# Patient Record
Sex: FEMALE | Race: WHITE
[De-identification: names, ages, dates, MRNs, and addresses within clinical notes are randomized per-mention and may not be internally consistent; named-entity substitution may affect disease eponyms.]

---

## 2017-11-17 ENCOUNTER — HOSPITAL ENCOUNTER (EMERGENCY)
Dept: HOSPITAL 25 - UCEAST | Age: 67
Discharge: HOME | End: 2017-11-17
Payer: MEDICARE

## 2017-11-17 VITALS — DIASTOLIC BLOOD PRESSURE: 71 MMHG | SYSTOLIC BLOOD PRESSURE: 128 MMHG

## 2017-11-17 DIAGNOSIS — J01.90: Primary | ICD-10-CM

## 2017-11-17 PROCEDURE — 99212 OFFICE O/P EST SF 10 MIN: CPT

## 2017-11-17 PROCEDURE — G0463 HOSPITAL OUTPT CLINIC VISIT: HCPCS

## 2017-11-17 NOTE — UC
Respiratory Complaint HPI





- HPI Summary


HPI Summary: 





66 yo female with one week hx of sinus pressure and pain/post nasal drip and 

upper teeth and gum pain/sensitivity





no f/c











- History of Current Complaint


Chief Complaint: UCGeneralIllness


Stated Complaint: SINUS CONGESTION


Hx Obtained From: Patient


Onset/Duration: Gradual Onset, Lasting Weeks


Timing: Constant


Severity Initially: Mild


Severity Currently: Moderate


Pain Intensity: 4


Pain Scale Used: 0-10 Numeric


Character: Cough: Nonproductive


Alleviating Factors: Nothing


Associated Signs And Symptoms: Positive: Nasal Congestion, Hoarseness, Sinus 

Discomfort


Related History: Similar Episode/Dx as: - acute sinusitis





- Allergies/Home Medications


Allergies/Adverse Reactions: 


 Allergies











Allergy/AdvReac Type Severity Reaction Status Date / Time


 


Amoxicillin [From Augmentin] Allergy  Diarrhea Verified 11/17/17 11:43


 


Clavulanic Acid Allergy  Diarrhea Verified 11/17/17 11:43





[From Augmentin]     


 


Codeine Allergy  Nausea Verified 11/17/17 11:43


 


Terfenadine [From [place]] Allergy  Unknown Verified 11/17/17 11:43





   Reaction  





   Details  


 


Tramadol Allergy  Nausea And Verified 11/17/17 11:43





   Vomiting  











Home Medications: 


 Home Medications





Alendronate Sodium [Fosamax-] 1 tab PO WEEKLY 11/17/17 [History Confirmed 11/17/ 17]


Cyclosporine 0.05% OPHTH (NF) [Restasis 0.05% OPHTH] 1 drop BOTH EYES BID 11/17/ 17 [History Confirmed 11/17/17]


clonazePAM TAB(*) [Klonopin TAB(*)] 1 tab PO BEDTIME PRN 11/17/17 [History 

Confirmed 11/17/17]











PMH/Surg Hx/FS Hx/Imm Hx


Previously Healthy: Yes


Respiratory History: Asthma - childhood


GI/ History: Gastroesophageal Reflux





- Surgical History


Surgical History: Yes


Surgery Procedure, Year, and Place: CHOLECYSTECTOMY; TONSILECTOMY





- Family History


Known Family History: Positive: Other - unknown, is adopted





- Social History


Alcohol Use: None


Substance Use Type: None


Smoking Status (MU): Never Smoked Tobacco





- Immunization History


Most Recent Influenza Vaccination: 10/2017





Review of Systems


Constitutional: Negative


Skin: Negative


Eyes: Negative


ENT: Dental Pain, Nasal Discharge, Sinus Congestion, Sinus Pain/Tenderness


Respiratory: Negative


Cardiovascular: Negative


Gastrointestinal: Negative


Genitourinary: Negative


Motor: Negative


Neurovascular: Negative


Musculoskeletal: Negative


Neurological: Negative


Psychological: Negative


Is Patient Immunocompromised?: No


All Other Systems Reviewed And Are Negative: Yes





Physical Exam


Triage Information Reviewed: Yes


Appearance: Well-Appearing, No Pain Distress, Well-Nourished


Vital Signs: 


 Initial Vital Signs











Temp  97.5 F   11/17/17 11:37


 


Pulse  86   11/17/17 11:37


 


Resp  16   11/17/17 11:37


 


BP  128/71   11/17/17 11:37


 


Pulse Ox  100   11/17/17 11:37











Vital Signs Reviewed: Yes


Eyes: Positive: Conjunctiva Clear


ENT: Positive: Hearing grossly normal, Nasal drainage, TMs normal, Sinus 

tenderness, Uvula midline


Neck: Positive: Supple, Nontender


Respiratory: Positive: Lungs clear, Normal breath sounds, No respiratory 

distress, No accessory muscle use


Cardiovascular: Positive: RRR, No Murmur


Neurological: Positive: Alert


Psychological Exam: Normal


Skin Exam: Normal





UC Diagnostic Evaluation





- Laboratory


O2 Sat by Pulse Oximetry: 100 - normal/not hypoxic





Respiratory Course/Dx





- Differential Dx/Diagnosis


Provider Diagnoses: acute sinusitis





Discharge





- Discharge Plan


Condition: Stable


Disposition: HOME


Prescriptions: 


Amoxicillin PO (*) [Amoxicillin 875 MG (*)] 875 mg PO BID #20 tab


Fluticasone NASAL SPRAY 50MCG* [Flonase NASAL SPRAY 50MCG*] 2 spray BOTH NARES 

DAILY #1 btl


Patient Education Materials:  Sinusitis (ED), Warm Compress or Soak (ED)


Referrals: 


Carlos Bennett MD [Medical Doctor] - 5 Days (if not improved)

## 2019-04-04 ENCOUNTER — HOSPITAL ENCOUNTER (EMERGENCY)
Dept: HOSPITAL 25 - UCEAST | Age: 69
Discharge: HOME | End: 2019-04-04
Payer: MEDICARE

## 2019-04-04 VITALS — SYSTOLIC BLOOD PRESSURE: 116 MMHG | DIASTOLIC BLOOD PRESSURE: 76 MMHG

## 2019-04-04 DIAGNOSIS — Z88.0: ICD-10-CM

## 2019-04-04 DIAGNOSIS — J40: Primary | ICD-10-CM

## 2019-04-04 PROCEDURE — G0463 HOSPITAL OUTPT CLINIC VISIT: HCPCS

## 2019-04-04 PROCEDURE — 71046 X-RAY EXAM CHEST 2 VIEWS: CPT

## 2019-04-04 PROCEDURE — 99212 OFFICE O/P EST SF 10 MIN: CPT

## 2019-04-04 NOTE — UC
Respiratory Complaint HPI





- HPI Summary


HPI Summary: 





cough x 1 week


cough is productive, yellow sputum 


nasal congestion , post nasal drip


wheezing, sob, pain with deep breathing 


no fever, no chills, 





- History of Current Complaint


Chief Complaint: UCRespiratory


Stated Complaint: COUGH


Time Seen by Provider: 04/04/19 10:10


Hx Obtained From: Patient


Onset/Duration: Gradual Onset, Lasting Weeks - 1, Still Present


Timing: Constant


Severity Initially: Moderate


Severity Currently: Moderate


Pain Intensity: 0


Character: Cough: Productive


Aggravating Factors: Exertion, Deep Breaths


Alleviating Factors: Nothing


Associated Signs And Symptoms: Positive: Dyspnea, Chills, Pleuritic Chest Pain, 

Wheezing, URI, Nasal Congestion.  Negative: Fever, Hemoptysis, Dizziness, Calf 

Pain, Calf Swelling, Edema





- Allergies/Home Medications


Allergies/Adverse Reactions: 


 Allergies











Allergy/AdvReac Type Severity Reaction Status Date / Time


 


amoxicillin Allergy  Diarrhea Verified 04/04/19 10:21


 


clavulanic acid Allergy  Diarrhea Verified 04/04/19 10:21


 


terfenadine Allergy  Unknown Verified 04/04/19 10:21





   Reaction  





   Details  


 


tramadol Allergy  Nausea Verified 04/04/19 10:21














PMH/Surg Hx/FS Hx/Imm Hx





- Additional Past Medical History


Additional PMH: 





vertigo


cervical headaches


GERD


Respiratory History: Asthma


GI/ History: Gastroesophageal Reflux





- Surgical History


Surgical History: Yes


Surgery Procedure, Year, and Place: CHOLECYSTECTOMY; TONSILECTOMY





- Family History


Known Family History: Positive: Other - unknown, is adopted





- Social History


Alcohol Use: None


Substance Use Type: None


Smoking Status (MU): Never Smoked Tobacco





- Immunization History


Most Recent Influenza Vaccination: 10/2017





Review of Systems


All Other Systems Reviewed And Are Negative: Yes


Constitutional: Positive: Chills, Fatigue


Skin: Positive: Negative


Eyes: Positive: Negative


ENT: Positive: Nasal Discharge


Respiratory: Positive: Shortness Of Breath, Cough


Cardiovascular: Positive: Negative


Is Patient Immunocompromised?: No





Physical Exam


Triage Information Reviewed: Yes


Appearance: Well-Appearing, No Pain Distress, Well-Nourished


Vital Signs: 


 Initial Vital Signs











Temp  98.2 F   04/04/19 10:15


 


Pulse  115   04/04/19 10:15


 


Resp  18   04/04/19 10:15


 


BP  116/76   04/04/19 10:15


 


Pulse Ox  96   04/04/19 10:15











Vital Signs Reviewed: Yes


Eye Exam: Normal


Eyes: Positive: Conjunctiva Clear


ENT: Positive: Pharynx normal, Nasal drainage


Neck: Positive: Supple, Nontender, No Lymphadenopathy


Respiratory: Positive: Chest non-tender, Wheezing


Cardiovascular: Positive: Tachycardia





Diagnostics





- Radiology


  ** No standard instances


Radiology Interpretation Completed By: Radiologist


Summary of Radiographic Findings: chest xray : IMPRESSION: NO EVIDENCE FOR 

ACTIVE CARDIOPULMONARY DISEASE.





Respiratory Course/Dx





- Differential Dx/Diagnosis


Provider Diagnosis: 


 Bronchitis








Discharge





- Sign-Out/Discharge


Documenting (check all that apply): Patient Departure


All imaging exams completed and their final reports reviewed: Yes





- Discharge Plan


Condition: Stable


Disposition: HOME


Prescriptions: 


Albuterol HFA INHALER* [Ventolin HFA Inhaler*] 2 puff INH Q6H PRN #1 mdi


 PRN Reason: Wheezing


DOXYcycline CAP(*) [DOXYcycline 100MG CAP(*)] 100 mg PO BID #20 cap


predniSONE [Prednisone 20 MG TAB] 20 mg PO BID #10 tablet


Patient Education Materials:  Acute Bronchitis (ED)


Referrals: 


Carlos Bennett MD [Primary Care Provider] - 7 Days





- Billing Disposition and Condition


Condition: STABLE


Disposition: Home

## 2019-07-29 ENCOUNTER — HOSPITAL ENCOUNTER (EMERGENCY)
Dept: HOSPITAL 25 - UCEAST | Age: 69
Discharge: HOME HEALTH | End: 2019-07-29
Payer: MEDICARE

## 2019-07-29 ENCOUNTER — HOSPITAL ENCOUNTER (EMERGENCY)
Dept: HOSPITAL 25 - ED | Age: 69
LOS: 1 days | Discharge: HOME | End: 2019-07-30
Payer: MEDICARE

## 2019-07-29 VITALS — DIASTOLIC BLOOD PRESSURE: 78 MMHG | SYSTOLIC BLOOD PRESSURE: 161 MMHG

## 2019-07-29 DIAGNOSIS — Z88.5: ICD-10-CM

## 2019-07-29 DIAGNOSIS — R20.2: ICD-10-CM

## 2019-07-29 DIAGNOSIS — Z88.1: ICD-10-CM

## 2019-07-29 DIAGNOSIS — R11.0: ICD-10-CM

## 2019-07-29 DIAGNOSIS — R11.0: Primary | ICD-10-CM

## 2019-07-29 DIAGNOSIS — R00.2: ICD-10-CM

## 2019-07-29 DIAGNOSIS — Z88.8: ICD-10-CM

## 2019-07-29 DIAGNOSIS — R10.32: Primary | ICD-10-CM

## 2019-07-29 DIAGNOSIS — Z88.0: ICD-10-CM

## 2019-07-29 DIAGNOSIS — K44.9: ICD-10-CM

## 2019-07-29 LAB
ALBUMIN SERPL BCG-MCNC: 4.5 G/DL (ref 3.2–5.2)
ALBUMIN/GLOB SERPL: 1.2 {RATIO} (ref 1–3)
ALP SERPL-CCNC: 53 U/L (ref 34–104)
ALT SERPL W P-5'-P-CCNC: 42 U/L (ref 7–52)
ANION GAP SERPL CALC-SCNC: 8 MMOL/L (ref 2–11)
APTT PPP: 32.4 SECONDS (ref 26–38)
AST SERPL-CCNC: 33 U/L (ref 13–39)
BASOPHILS # BLD AUTO: 0 10^3/UL (ref 0–0.2)
BUN SERPL-MCNC: 12 MG/DL (ref 6–24)
BUN/CREAT SERPL: 15.6 (ref 8–20)
CALCIUM SERPL-MCNC: 9.6 MG/DL (ref 8.6–10.3)
CHLORIDE SERPL-SCNC: 106 MMOL/L (ref 101–111)
EOSINOPHIL # BLD AUTO: 0.2 10^3/UL (ref 0–0.6)
GLOBULIN SER CALC-MCNC: 3.9 G/DL (ref 2–4)
GLUCOSE SERPL-MCNC: 103 MG/DL (ref 70–100)
HCO3 SERPL-SCNC: 25 MMOL/L (ref 22–32)
HCT VFR BLD AUTO: 39 % (ref 35–47)
HGB BLD-MCNC: 13.5 G/DL (ref 12–16)
INR PPP/BLD: 1.02 (ref 0.82–1.09)
LYMPHOCYTES # BLD AUTO: 3.1 10^3/UL (ref 1–4.8)
MCH RBC QN AUTO: 32 PG (ref 27–31)
MCHC RBC AUTO-ENTMCNC: 35 G/DL (ref 31–36)
MCV RBC AUTO: 90 FL (ref 80–97)
MONOCYTES # BLD AUTO: 0.6 10^3/UL (ref 0–0.8)
NEUTROPHILS # BLD AUTO: 5.6 10^3/UL (ref 1.5–7.7)
NRBC # BLD AUTO: 0 10^3/UL
NRBC BLD QL AUTO: 0.1
PLATELET # BLD AUTO: 256 10^3/UL (ref 150–450)
POTASSIUM SERPL-SCNC: 4.1 MMOL/L (ref 3.5–5)
PROT SERPL-MCNC: 8.4 G/DL (ref 6.4–8.9)
RBC # BLD AUTO: 4.28 10^6 /UL (ref 3.7–4.87)
SODIUM SERPL-SCNC: 139 MMOL/L (ref 135–145)
TROPONIN I SERPL-MCNC: 0 NG/ML (ref ?–0.04)
WBC # BLD AUTO: 9.6 10^3/UL (ref 3.5–10.8)

## 2019-07-29 PROCEDURE — 85610 PROTHROMBIN TIME: CPT

## 2019-07-29 PROCEDURE — 85025 COMPLETE CBC W/AUTO DIFF WBC: CPT

## 2019-07-29 PROCEDURE — 81003 URINALYSIS AUTO W/O SCOPE: CPT

## 2019-07-29 PROCEDURE — 80053 COMPREHEN METABOLIC PANEL: CPT

## 2019-07-29 PROCEDURE — 93005 ELECTROCARDIOGRAM TRACING: CPT

## 2019-07-29 PROCEDURE — G0463 HOSPITAL OUTPT CLINIC VISIT: HCPCS

## 2019-07-29 PROCEDURE — 99283 EMERGENCY DEPT VISIT LOW MDM: CPT

## 2019-07-29 PROCEDURE — 71045 X-RAY EXAM CHEST 1 VIEW: CPT

## 2019-07-29 PROCEDURE — 85730 THROMBOPLASTIN TIME PARTIAL: CPT

## 2019-07-29 PROCEDURE — 99212 OFFICE O/P EST SF 10 MIN: CPT

## 2019-07-29 PROCEDURE — 36415 COLL VENOUS BLD VENIPUNCTURE: CPT

## 2019-07-29 PROCEDURE — 83605 ASSAY OF LACTIC ACID: CPT

## 2019-07-29 PROCEDURE — 83880 ASSAY OF NATRIURETIC PEPTIDE: CPT

## 2019-07-29 PROCEDURE — 83690 ASSAY OF LIPASE: CPT

## 2019-07-29 PROCEDURE — 84484 ASSAY OF TROPONIN QUANT: CPT

## 2019-07-29 PROCEDURE — 74177 CT ABD & PELVIS W/CONTRAST: CPT

## 2019-07-29 NOTE — ED
HPI Chest Pain





- HPI Summary


HPI Summary: 





This pt is a 68 y/o female presenting to [facility] c/o chest pain, palpitations, 

abd pain, and left arm tingling. Pt reports at around 11:30 this morning she 

felt nauseous. She took pepto bismol and felt better. At around 13:00 today she 

had a hot dog and half an hour later she felt nauseous again and had a "

horrible sensation that started from her feet and came up all over her whole 

body. She states "I felt like I was going to explode." She had palpitations, 

characterized as heart racing, and left arm tingling. Pt then took pepto bismol 

again and her symptoms subsided. At around 15:00 she felt the same sensation 

again with left arm tingling, described as "like ants walking on it." Pt 

reports left sided chest pressure, described "like trapped gas," and left lower 

abdominal pain (which she has had for some time now and attributes it to her 

chronic constipation). 


She has hx of GERD for which she takes Zantac and at bed time takes Gaviscon or 

sometimes Gas X. Pt also takes Miralax PRN for constipation. 





Pt has hx of cholecystectomy. 


Denies hx of MI. She had a stress test "many years ago." 


She denies smoking. 





- History of Current Complaint


Chief Complaint: EDChestPainROMI


Time Seen by Provider: 07/29/19 17:41


Hx Obtained From: Patient


Onset/Duration: Started Hours Ago, Still Present


Timing: Lasting Hours


Current Severity: Mild


Pain Intensity: 3


Pain Scale Used: 0-10 Numeric


Chest Pain Location: Left Anterior


Chest Pain Radiates: No


Character: Pressure/Squeezing - pressure


Aggravating Factor(s): Nothing


Alleviating Factor(s): Nothing


Associated Signs and Symptoms: Positive: Chest Pain, Tingling - left arm, Nausea

, Palpitations, Abdominal Pain.  Negative: Fever





- Allergy/Home Medications


Allergies/Adverse Reactions: 


 Allergies











Allergy/AdvReac Type Severity Reaction Status Date / Time


 


amoxicillin Allergy  Diarrhea Verified 07/29/19 16:29


 


clavulanic acid Allergy  Diarrhea Verified 07/29/19 16:29


 


codeine Allergy  Vomiting Verified 07/29/19 16:29


 


terfenadine Allergy  Unknown Verified 07/29/19 16:29





   Reaction  





   Details  


 


tramadol Allergy  Nausea Verified 07/29/19 16:29


 


celdane Allergy  Unknown Uncoded 07/29/19 16:29





   Reaction  





   Details  














PMH/Surg Hx/FS Hx/Imm Hx


Endocrine/Hematology History: 


   Denies: Hx Diabetes, Hx Thyroid Disease


Cardiovascular History: 


   Denies: Hx Hypertension, Hx Myocardial Infarction, Hx Pacemaker/ICD


Respiratory History: Reports: Hx Asthma - As a child


   Denies: Hx Chronic Obstructive Pulmonary Disease (COPD), Other Respiratory 

Problems/Disorders


GI History: Reports: Hx Gastroesophageal Reflux Disease


   Denies: Hx Ulcer


Musculoskeletal History: 


   Denies: Hx Rheumatoid Arthritis, Hx Osteoporosis


Sensory History: 


   Denies: Hx Hearing Aid


Psychiatric History: 


   Denies: Hx Panic Disorder





- Cancer History


Hx Chemotherapy: No


Hx Radiation Therapy: No





- Surgical History


Surgical History: Yes


Surgery Procedure, Year, and Place: CHOLECYSTECTOMY; TONSILECTOMY


Infectious Disease History: No


Infectious Disease History: Reports: Hx Shingles


   Denies: Hx Hepatitis, Hx Human Immunodeficiency Virus (HIV), Traveled 

Outside the [place] in Last 30 Days





- Family History


Known Family History: Positive: Unknown - pt is adopted





- Social History


Alcohol Use: None


Substance Use Type: Reports: None


Hx Tobacco Use: No


Smoking Status (MU): Never Smoked Tobacco





Review of Systems


Negative: Fever


Positive: Palpitations, Chest Pain


Positive: Abdominal Pain, Nausea


Positive: Paresthesia - left arm


All Other Systems Reviewed And Are Negative: Yes





Physical Exam





- Summary


Physical Exam Summary: 





Appearance: Well appearing, no pain distress


Skin: warm, dry, reflects adequate perfusion


Head/face: normal


Eyes: EOMI, ALEXA


ENT: normal


Neck: supple, non-tender


Respiratory: CTA, breath sounds present


Cardiovascular: RRR, pulses symmetrical 


Abdomen: tenderness in the left lower quadrant, soft


Musculoskeletal: normal, strength/ROM intact


Neuro: normal, sensory motor intact, A&Ox3


Triage Information Reviewed: Yes


Vital Signs On Initial Exam: 


 Initial Vitals











Temp Pulse Resp BP Pulse Ox


 


 98.3 F   80   16   122/60   98 


 


 07/29/19 17:31  07/29/19 17:31  07/29/19 17:31  07/29/19 17:31  07/29/19 17:31











Vital Signs Reviewed: Yes





Diagnostics





- Vital Signs


 Vital Signs











  Temp Pulse Resp BP Pulse Ox


 


 07/29/19 17:43   96  11  127/77  98


 


 07/29/19 17:42   95  19   97


 


 07/29/19 17:31  98.3 F  80  16  122/60  98














- Laboratory


Result Diagrams: 


 07/29/19 18:32





 07/29/19 18:32


Lab Statement: Any lab studies that have been ordered have been reviewed, and 

results considered in the medical decision making process.





- Radiology


  ** Chest XR


Radiology Interpretation Completed By: ED Physician


Summary of Radiographic Findings: Negative chest XR, pending official report.





- EKG


  ** 17:24


Cardiac Rate: NL - at 96 bpm


EKG Rhythm: Sinus Rhythm


Summary of EKG Findings: no acute changes.





Chest Pain Course/Dx





- Course


Assessment/Plan: Pt is a 68 y/o female presenting to [facility] c/o chest pain on 

the left side, palpitations, left lower abd pain, and left arm tingling. Hx of 

GERD, chronic constipation, cholecystectomy.  Blood work and urinalysis 

obtained and are unremarkable.  In the ED course the pt was given IV fluids.  

CT abdomen/pelvis was ordered.  Pt will be signed out to Dr. Brar pending 

dispo and awaiting CT results.





- Diagnoses


Provider Diagnoses: 


 Left lower quadrant abdominal pain








Discharge





- Sign-Out/Discharge


Documenting (check all that apply): Sign-Out Patient


Signing out patient TO: Shawanda Brar - pending CT A/P


Patient Received Moderate/Deep Sedation with Procedure: No





- Discharge Plan


Condition: Stable


Referrals: 


Carlos Bennett MD [Primary Care Provider] - 





- Billing Disposition and Condition


Condition: STABLE





- Attestation Statements


Document Initiated by Enedelia: Yes


Documenting Scribe: Monica Bob


Provider For Whom Lindaibabi is Documenting (Include Credential): Dayne Mendoza MD


Scribe Attestation: 


Monica VU, scribed for Dayne Mendoza MD on 07/29/19 at 2202. 


Scribe Documentation Reviewed: Yes


Provider Attestation: 


The documentation as recorded by the Monica bryant accurately reflects 

the service I personally performed and the decisions made by me, Dayne Mendoza MD


Status of Scribe Document: Viewed

## 2019-07-29 NOTE — UC
Cardiac HPI





- HPI Summary


HPI Summary: 





68 yo female presents with nausea and left arm tingling. She tells me that this 

morning she had some nausea and took pepto bismol with slight relief. She had 

lunch around 1300 and around 1330 began to feel more nauseous, had left arm 

tingling, and felt her whole body was "full" like she had gas. She took more 

pepto bismol, but this did not change her symptoms. She did feel a little short 

of breath and noticed her heart was racing. Her  brought her to urgent 

care. She has not had any chest pain. Her left arm tingling has resolved, but 

states still has some tingling in her left hand. Denies recent illness, fever, 

SOB currently, chest pain, abdominal pain, vomiting, dysuria, or back pain. No 

headache or dizziness. 





- History of Current Complaint


Chief Complaint: UCGeneralIllness


Stated Complaint: NAUSEA, AND PALIPATIONS AND ARM TINGLING


Time Seen by Provider: 07/29/19 16:28


Hx Obtained From: Patient


Onset/Duration: Sudden Onset


Initial Severity: Moderate


Current Severity: Moderate


Pain Intensity: 8





- Allergy/Home Medications


Allergies/Adverse Reactions: 


 Allergies











Allergy/AdvReac Type Severity Reaction Status Date / Time


 


amoxicillin Allergy  Diarrhea Verified 07/29/19 16:29


 


clavulanic acid Allergy  Diarrhea Verified 07/29/19 16:29


 


codeine Allergy  Vomiting Verified 07/29/19 16:29


 


terfenadine Allergy  Unknown Verified 07/29/19 16:29





   Reaction  





   Details  


 


tramadol Allergy  Nausea Verified 07/29/19 16:29


 


celdane Allergy  Unknown Uncoded 07/29/19 16:29





   Reaction  





   Details  











Home Medications: 


 Home Medications





Bismuth Subsalicylate [Pepto-Bismol] 15 ml PO ONCE PRN 07/29/19 [History 

Confirmed 07/29/19]


Multivitamin [Once Daily] 1 tab PO DAILY 07/29/19 [History Confirmed 07/29/19]


raNITIdine HCl [Zantac] 1 tab PO DAILY 07/29/19 [History Confirmed 07/29/19]











PMH/Surg Hx/FS Hx/Imm Hx


GI/ History: Gastroesophageal Reflux


Psychological History: Anxiety





- Surgical History


Surgical History: Yes


Surgery Procedure, Year, and Place: CHOLECYSTECTOMY; TONSILECTOMY





- Family History


Known Family History: Positive: Other - unknown, is adopted





- Social History


Lives: With Family


Alcohol Use: None


Substance Use Type: None


Smoking Status (MU): Never Smoked Tobacco





- Immunization History


Most Recent Influenza Vaccination: 10/2017





Review of Systems


All Other Systems Reviewed And Are Negative: Yes


Constitutional: Positive: Negative


Skin: Positive: Negative


Eyes: Positive: Negative


ENT: Positive: Negative


Respiratory: Positive: Shortness Of Breath


Cardiovascular: Positive: Palpitations


Gastrointestinal: Positive: Nausea


Genitourinary: Positive: Negative


Motor: Positive: Negative


Neurovascular: Positive: Negative


Musculoskeletal: Positive: Negative


Neurological: Positive: Negative


Psychological: Positive: Negative





Physical Exam





- Summary


Physical Exam Summary: 





GENERAL: NAD. WDWN. No pain distress.


SKIN: No rashes, sores, lesions, or open wounds.


NECK: Supple. Nontender. No lymphadenopathy. 


CHEST:  CTAB. No r/r/w. No accessory muscle use. Breathing comfortably and in 

no distress.


CV:  RRR. Without m/r/g. Pulses intact. Cap refill <2seconds


ABDOMEN:  Soft. NTTP. No distention or guarding. No CVA tenderness. Bowel 

sounds present


NEURO: Alert. 


PSYCH: Age appropriate behavior.


Triage Information Reviewed: Yes


Vital Signs: 


 Initial Vital Signs











Temp  99.1 F   07/29/19 16:25


 


Pulse  118   07/29/19 16:25


 


Resp  20   07/29/19 16:25


 


BP  161/78   07/29/19 16:25


 


Pulse Ox  99   07/29/19 16:25











Vital Signs Reviewed: Yes





Diagnostics





- EKG


Cardiac Rate: Tachycardia


Cardiac Rhythm: Sinus: Normal


Ectopy: None


ST Segment: Normal


Summary of EKG Findings: Read by Dr. Hayes





- Assessment/Plan


Course Of Treatment: 





Given her symptoms, I am most concerned for an underlying cardiac condition vs 

CVA/TIA. I recommended that she be further evaluated in the ED. She was 

agreeable to this, but declined ambulance transfer. Her  with her will 

drive her. 





- Clinical Impression


Provider Diagnosis: 


 Nausea, Left hand paresthesia, Palpitations








Discharge





- Sign-Out/Discharge


Documenting (check all that apply): Patient Departure


All imaging exams completed and their final reports reviewed: No Studies





- Discharge Plan


Condition: Stable


Disposition: HOME-RECOMMEND TO ED


Referrals: 


Carlos Bennett MD [Primary Care Provider] - 


Additional Instructions: 


Please go to the ER for further evaluation of your symptoms





- Billing Disposition and Condition


Condition: STABLE


Disposition: Home-Recommend to ED





- Attestation Statements


Provider Attestation: 





I was available for consult. This patient was seen by the SHERWIN. The patient was 

not presented to, seen by, or examined by me. -Caitlyn

## 2019-07-29 NOTE — ED
Progress





- Progress Note


Progress Note: 





Pt is a signout from Dr. Mendoza to Dr. Brar at shift change 2200 19 

pending a CT A/P.  





- Results/Orders


Results/Orders: 





Her CT A/P shows that she has the followin. There is a small hiatal hernia. 


2. The appendix is unremarkable. 


3. No evidence for acute diverticulitis or other bowel inflammatory change. 


ED Physician has reviewed this report. 








Course/Dx





- Course


Course Of Treatment: Pt is a signout from Dr. Mendoza to Dr. rBar at shift 

change 2200 19 pending a CT A/P.  Her CT A/P showed the followin. 

There is a small hiatal hernia.  2. The appendix is unremarkable.  3. No 

evidence for acute diverticulitis or other bowel inflammatory change.  She will 

be discharged home with a Dx of Left lower quadrant abdominal pain and the 

instructions dictated by Dr. Mendoza.





- Diagnoses


Provider Diagnoses: 


 Left lower quadrant abdominal pain





Is Visit Pregnancy Related: No





Discharge





- Sign-Out/Discharge


Documenting (check all that apply): Patient Departure - discharge, Receiving 

Sign-Out


Receiving patient FROM: Dayne Mendoza


Patient Received Moderate/Deep Sedation with Procedure: No





- Discharge Plan


Condition: Stable


Disposition: HOME


Patient Education Materials:  Acute Abdominal Pain (ED)


Referrals: 


Carlos Bennett MD [Primary Care Provider] - 2 Days


Additional Instructions: 


Follow up with your primary care provider in 2-3 days and return to the 

emergency department for any new or worsening symptoms. 





- Attestation Statements


Document Initiated by Scribe: Yes


Documenting Scribe: Jayy Mccord


Provider For Whom Scribe is Documenting (Include Credential): Shawanda Brar MD


Scribe Attestation: 


Jayy VU, scribed for Shawanda Brar MD on 19 at [phone]. 


Status of Scribe Document: Ready

## 2019-07-30 VITALS — SYSTOLIC BLOOD PRESSURE: 126 MMHG | DIASTOLIC BLOOD PRESSURE: 69 MMHG
